# Patient Record
Sex: FEMALE | Race: WHITE | NOT HISPANIC OR LATINO | Employment: FULL TIME | URBAN - METROPOLITAN AREA
[De-identification: names, ages, dates, MRNs, and addresses within clinical notes are randomized per-mention and may not be internally consistent; named-entity substitution may affect disease eponyms.]

---

## 2018-07-02 ENCOUNTER — OFFICE VISIT (OUTPATIENT)
Dept: URGENT CARE | Facility: CLINIC | Age: 38
End: 2018-07-02
Payer: COMMERCIAL

## 2018-07-02 ENCOUNTER — APPOINTMENT (OUTPATIENT)
Dept: RADIOLOGY | Facility: CLINIC | Age: 38
End: 2018-07-02
Payer: COMMERCIAL

## 2018-07-02 VITALS
TEMPERATURE: 99.1 F | DIASTOLIC BLOOD PRESSURE: 84 MMHG | OXYGEN SATURATION: 100 % | HEART RATE: 99 BPM | RESPIRATION RATE: 18 BRPM | SYSTOLIC BLOOD PRESSURE: 128 MMHG

## 2018-07-02 DIAGNOSIS — R07.81 RIB PAIN ON RIGHT SIDE: ICD-10-CM

## 2018-07-02 DIAGNOSIS — R07.81 RIB PAIN ON LEFT SIDE: Primary | ICD-10-CM

## 2018-07-02 PROCEDURE — 71101 X-RAY EXAM UNILAT RIBS/CHEST: CPT

## 2018-07-02 PROCEDURE — 99203 OFFICE O/P NEW LOW 30 MIN: CPT | Performed by: FAMILY MEDICINE

## 2018-07-02 RX ORDER — BUPROPION HYDROCHLORIDE 75 MG/1
75 TABLET ORAL 2 TIMES DAILY
COMMUNITY

## 2018-07-02 NOTE — PATIENT INSTRUCTIONS
Preliminary x-rays are negative patient will be calling tomorrow for formal x-ray reports  In the meantime should be taking Aleve every day in the morning along with supplementing with Tylenol  She refused Tylenol with codeine for pain

## 2018-07-02 NOTE — PROGRESS NOTES
[facility] - Patient Visit Note  Bruno Huang 43 y o  female MRN: [id number]      Assessment / Plan:   Diagnosis ICD-10-CM Associated Orders   1  Rib pain on left side R07 81    2  Rib pain on right side R07 81 XR ribs right w pa chest min 3 views       Reason For Visit / Chief Complaint  Chief Complaint   Patient presents with    Rib Injury     Pt c/o rib pain since last week    Jami Stoner Discussion:  Preliminary x-rays are negative patient will be calling tomorrow for formal x-ray reports  In the meantime should be taking Aleve every day in the morning along with supplementing with Tylenol  She refused Tylenol with codeine for pain  HPI:  Bruno Huang is a 43 y o  female Patient           This Patient Presents with a history of having been on the wild mouse at [facility] last Monday at which time the mouse abruptly stopped and the bar uses a keep are hit the patient's ribs bilaterally  Over the week she has improved somewhat on the left but on the right she is still painful knee anterior axillary line  She has no cough or sputum production she has not had any hemoptysis  She denies any shortness of breath  In the past she has had pneumonia in 2 and at that time she was told she had a fracture from coughing  ALLERGIES:  Allergies as of 07/02/2018    (No Known Allergies)       The following portions of the patient's history were reviewed and updated as appropriate: Allergies, current medications, past family history, past medical history, past social history, past surgical history, and the problem list     Historical Information   No past medical history on file  No past surgical history on file    Social History   History   Alcohol use Not on file     History   Drug use: Unknown     History   Smoking Status    Not on file   Smokeless Tobacco    Not on file     No family history on file            MEDS:    Current Outpatient Prescriptions:     buPROPion (WELLBUTRIN) 75 mg tablet, Take 75 mg by mouth 2 (two) times a day, Disp: , Rfl:     Topiramate (TROKENDI XR PO), Take by mouth, Disp: , Rfl:     FACILITY ADMINISTERED MEDS:        REVIEW OF SYSTEMS    GENERAL: NEGATIVE for:  Generalized Fatigue                             Chills                              Fever                             Myalgias     OPTHALMIC: NEGATIVE for:  Diplopia                            Scotomata                            Visual Changes                            Blurred Vision     ENT:  EARS NEGATIVE for:  Hearing Difficulty                            Tinnitus                            Vertigo                            Dizziness                            Ear Pain                            Ear Drainage               NOSE NEGATIVE for:  Nasal Congestion                            Nasal Discharge                            Sinus Pain / Pressure               THROAT NEGATIVE for:  Sore Throat / Throat Pain                            Difficulty Swallowing     RESPIRATORY: NEGATIVE for:  Cough                            Wheezing                            Sputum Production                            Sob / Tachypnea                            Hemoptysis     CARDIOVASCULAR: NEGATIVE for:  Chest Pain                             SOB (cardiac Related)                             Dyspnea on Exertion                             Orthopnea                             PND                             Leg Edema                             Palpitations                               Irregularities/rythym                       CURRENT VITALS:   Blood Pressure: 128/84 (07/02/18 1910)  Pulse: 99 (07/02/18 1910)  Temperature: 99 1 °F (37 3 °C) (07/02/18 1910)  Respirations: 18 (07/02/18 1910)  SpO2: 100 % (07/02/18 1910)  /84   Pulse 99   Temp 99 1 °F (37 3 °C)   Resp 18   SpO2 100%       PHYSICAL EXAM: General Appearance:    Alert, cooperative, no apparent distress, appears stated age     Oriented x3    Head:    Normocephalic, without obvious abnormality, atraumatic   Eyes:      EOM's intact,      MAKAYLA,        conjunctiva/corneas clear,          fundi not visualized well   Ears:     Normal external ear canals     Tm right side  Normal     Tm left side    Normal       Nose:   Nares normal externally, septum midline,     mucosa normal,     No anterior drainage         Sinuses   with out   tenderness to palpation / percussion     Throat:   Lips, mucosa, and tongue normal       Anterior pharynx   Normal      Posterior pharynx   Normal        No exudate obvious       Neck:   Supple, symmetrical, trachea midline and moveable    Normal thyroid click present    No carotid bruits appreciated        Lymphatics:     Adenopathy in anterior cervical chain  Normal      Adenopathy in posterior cervical chain   Normal     Lungs:     Clear to auscultation bilaterally    No rales    No ronchi    No wheeze     Heart[de-identified]    Regular rate and rhythm, S1 and S2 normal,     No S3, S4, audible    No murmurs, rubs      Extremities:     Extremities grossly normal     atraumatic,     no cyanosis or edema        Skin:     Skin color, texture, turgor normal, no rashes or lesions    There is no ecchymosis noted over the areas where the patient complains of rib pain  Examination the right rib Show some discomfort to palpation at the anterior axillary line approximately T7 dermatome  Examination of the left rib Shows minimal discomfort if any to palpation in the anterior axillary line approximately T7 dermatome  Neurologic   CNII-XII intact,     Motor strength in upper and lower ext  Normal and symmetrical ,     Gross sensory intact          Follow up at primary care in 2 or 3 days, or sooner if needed OR pesent to local Emergency Room if symptoms are worsening        Portions of the record may have been created with voice recognition software   Occasional wrong word or "sound a like" substitutions may have occurred due to the inherent limitations of voice recognition software   Read the chart carefully and recognize, using context, where substitutions have occurred

## 2021-04-06 DIAGNOSIS — Z23 ENCOUNTER FOR IMMUNIZATION: ICD-10-CM
